# Patient Record
Sex: MALE | Race: WHITE | Employment: FULL TIME | URBAN - METROPOLITAN AREA
[De-identification: names, ages, dates, MRNs, and addresses within clinical notes are randomized per-mention and may not be internally consistent; named-entity substitution may affect disease eponyms.]

---

## 2021-03-19 PROBLEM — E78.00 HIGH CHOLESTEROL: Status: ACTIVE | Noted: 2021-03-19

## 2021-03-19 PROBLEM — R73.01 IFG (IMPAIRED FASTING GLUCOSE): Status: ACTIVE | Noted: 2021-03-19

## 2021-03-19 PROBLEM — E55.9 VITAMIN D DEFICIENCY: Status: ACTIVE | Noted: 2021-03-19

## 2021-08-19 ENCOUNTER — HOSPITAL ENCOUNTER (OUTPATIENT)
Age: 51
Discharge: HOME | End: 2021-08-19
Payer: COMMERCIAL

## 2021-08-19 VITALS
DIASTOLIC BLOOD PRESSURE: 82 MMHG | HEART RATE: 84 BPM | TEMPERATURE: 99 F | OXYGEN SATURATION: 97 % | WEIGHT: 235 LBS | HEIGHT: 72 IN | SYSTOLIC BLOOD PRESSURE: 136 MMHG | RESPIRATION RATE: 16 BRPM | BODY MASS INDEX: 31.83 KG/M2

## 2021-08-19 DIAGNOSIS — Z20.822 CLOSE EXPOSURE TO COVID-19 VIRUS: Primary | ICD-10-CM

## 2021-08-19 DIAGNOSIS — Z20.822 LAB TEST NEGATIVE FOR COVID-19 VIRUS: ICD-10-CM

## 2021-08-19 LAB — SARS-COV-2 RNA RESP QL NAA+PROBE: NOT DETECTED

## 2021-08-19 PROCEDURE — 99202 OFFICE O/P NEW SF 15 MIN: CPT

## 2021-08-19 PROCEDURE — 99203 OFFICE O/P NEW LOW 30 MIN: CPT

## 2021-08-19 NOTE — ED INITIAL ASSESSMENT (HPI)
Patient reports his wife tested positive for covid on Monday. Patient is requesting covid test.  Patient denies symptoms.

## 2021-08-20 NOTE — ED PROVIDER NOTES
My AC  Patient Seen in: [facility]      History   Patient presents with:  Testing    Stated Complaint:     HPI/Subjective:   HPI    Adele Stewart is a 82-year-old male who presents today for Covid testing.   His daughter is here as well and is exper place, and time. Patient appears well-developed and well-nourished, non-toxic and in no acute distress. Actively wearing a mask. Head: Normocephalic and atraumatic.    Cardiovascular: Normal rate, regular rhythm, normal heart sounds and intact distal puls

## 2021-09-27 PROBLEM — R79.89 LOW TESTOSTERONE: Status: ACTIVE | Noted: 2021-09-27

## 2021-09-27 PROBLEM — E66.9 OBESITY (BMI 30-39.9): Status: ACTIVE | Noted: 2021-09-27

## 2021-10-16 ENCOUNTER — HOSPITAL ENCOUNTER (EMERGENCY)
Facility: HOSPITAL | Age: 51
Discharge: HOME | End: 2021-10-16
Attending: EMERGENCY MEDICINE
Payer: COMMERCIAL

## 2021-10-16 ENCOUNTER — APPOINTMENT (OUTPATIENT)
Dept: CT IMAGING | Facility: HOSPITAL | Age: 51
End: 2021-10-16
Attending: EMERGENCY MEDICINE
Payer: COMMERCIAL

## 2021-10-16 VITALS
SYSTOLIC BLOOD PRESSURE: 105 MMHG | HEIGHT: 72 IN | BODY MASS INDEX: 31.15 KG/M2 | WEIGHT: 230 LBS | DIASTOLIC BLOOD PRESSURE: 59 MMHG | HEART RATE: 81 BPM | TEMPERATURE: 98 F | OXYGEN SATURATION: 95 % | RESPIRATION RATE: 16 BRPM

## 2021-10-16 DIAGNOSIS — N23 RENAL COLIC: Primary | ICD-10-CM

## 2021-10-16 DIAGNOSIS — N20.0 KIDNEY STONE: ICD-10-CM

## 2021-10-16 PROCEDURE — 99284 EMERGENCY DEPT VISIT MOD MDM: CPT

## 2021-10-16 PROCEDURE — 96374 THER/PROPH/DIAG INJ IV PUSH: CPT

## 2021-10-16 PROCEDURE — 74176 CT ABD & PELVIS W/O CONTRAST: CPT | Performed by: EMERGENCY MEDICINE

## 2021-10-16 PROCEDURE — 96361 HYDRATE IV INFUSION ADD-ON: CPT

## 2021-10-16 PROCEDURE — 85025 COMPLETE CBC W/AUTO DIFF WBC: CPT | Performed by: EMERGENCY MEDICINE

## 2021-10-16 PROCEDURE — 80053 COMPREHEN METABOLIC PANEL: CPT | Performed by: EMERGENCY MEDICINE

## 2021-10-16 PROCEDURE — 96375 TX/PRO/DX INJ NEW DRUG ADDON: CPT

## 2021-10-16 PROCEDURE — 81001 URINALYSIS AUTO W/SCOPE: CPT | Performed by: EMERGENCY MEDICINE

## 2021-10-16 PROCEDURE — 99285 EMERGENCY DEPT VISIT HI MDM: CPT

## 2021-10-16 RX ORDER — HYDROCODONE BITARTRATE AND ACETAMINOPHEN 5; 325 MG/1; MG/1
1-2 TABLET ORAL EVERY 6 HOURS PRN
Qty: 10 TABLET | Refills: 0 | Status: SHIPPED | OUTPATIENT
Start: 2021-10-16 | End: 2021-10-23

## 2021-10-16 RX ORDER — ONDANSETRON 4 MG/1
4 TABLET, ORALLY DISINTEGRATING ORAL EVERY 4 HOURS PRN
Qty: 10 TABLET | Refills: 0 | Status: SHIPPED | OUTPATIENT
Start: 2021-10-16 | End: 2021-10-23

## 2021-10-16 RX ORDER — HYDROCODONE BITARTRATE AND ACETAMINOPHEN 5; 325 MG/1; MG/1
1-2 TABLET ORAL EVERY 6 HOURS PRN
Qty: 10 TABLET | Refills: 0 | Status: SHIPPED | OUTPATIENT
Start: 2021-10-16 | End: 2021-10-16

## 2021-10-16 RX ORDER — CEPHALEXIN 500 MG/1
500 CAPSULE ORAL 3 TIMES DAILY
Qty: 15 CAPSULE | Refills: 0 | Status: SHIPPED | OUTPATIENT
Start: 2021-10-16 | End: 2021-10-21

## 2021-10-16 RX ORDER — CEPHALEXIN 500 MG/1
500 CAPSULE ORAL ONCE
Status: COMPLETED | OUTPATIENT
Start: 2021-10-16 | End: 2021-10-16

## 2021-10-16 RX ORDER — TAMSULOSIN HYDROCHLORIDE 0.4 MG/1
0.4 CAPSULE ORAL DAILY
Qty: 7 CAPSULE | Refills: 0 | Status: SHIPPED | OUTPATIENT
Start: 2021-10-16 | End: 2021-10-23

## 2021-10-16 RX ORDER — KETOROLAC TROMETHAMINE 30 MG/ML
15 INJECTION, SOLUTION INTRAMUSCULAR; INTRAVENOUS ONCE
Status: COMPLETED | OUTPATIENT
Start: 2021-10-16 | End: 2021-10-16

## 2021-10-16 RX ORDER — CEPHALEXIN 500 MG/1
500 CAPSULE ORAL 3 TIMES DAILY
Qty: 15 CAPSULE | Refills: 0 | Status: SHIPPED | OUTPATIENT
Start: 2021-10-16 | End: 2021-10-16

## 2021-10-16 RX ORDER — HYDROMORPHONE HYDROCHLORIDE 1 MG/ML
1 INJECTION, SOLUTION INTRAMUSCULAR; INTRAVENOUS; SUBCUTANEOUS ONCE
Status: COMPLETED | OUTPATIENT
Start: 2021-10-16 | End: 2021-10-16

## 2021-10-16 RX ORDER — ONDANSETRON 4 MG/1
4 TABLET, ORALLY DISINTEGRATING ORAL EVERY 4 HOURS PRN
Qty: 10 TABLET | Refills: 0 | Status: SHIPPED | OUTPATIENT
Start: 2021-10-16 | End: 2021-10-16

## 2021-10-16 RX ORDER — ONDANSETRON 2 MG/ML
4 INJECTION INTRAMUSCULAR; INTRAVENOUS ONCE
Status: COMPLETED | OUTPATIENT
Start: 2021-10-16 | End: 2021-10-16

## 2021-10-16 RX ORDER — TAMSULOSIN HYDROCHLORIDE 0.4 MG/1
0.4 CAPSULE ORAL DAILY
Qty: 7 CAPSULE | Refills: 0 | Status: SHIPPED | OUTPATIENT
Start: 2021-10-16 | End: 2021-10-16

## 2021-10-16 NOTE — ED PROVIDER NOTES
Patient Seen in: [facility] Emergency Department      History   Patient presents with:  Abdomen/Flank Pain    Stated Complaint:     Subjective:   HPI    Patient is a 51-year-old male with a history of kidney stones presents with abrupt onset of left Moist mucous membranes  Lungs: No tachypnea  Cardiac: No tachycardia  Abdomen: Soft. No focal tenderness. Skin: No rashes, pallor  Neuro: No focal deficits.   Extremities: No cyanosis/edema    ED Course     Labs Reviewed   COMP METABOLIC PANEL (14) - Abno count. Case was discussed with Connie Medina of urology who is patient is seen in the past.  Will start on oral antibiotics.   Discussed at length with patient and if he develops any fevers, uncontrolled pain, vomiting, new complaints is return immediately to t

## 2021-10-17 NOTE — CM/SW NOTE
Call received from pt's wife - the escribed CD'X (Keflex, Norco, Zofran and Flomax) were sent to [facility] in [facility] which is now closed. Wife requesting rx's be sent to [facility] on [address].     ALIE called and spoke with [facility] Pharmacist @

## 2022-02-28 PROBLEM — E23.6 PITUITARY MASS (HCC): Status: ACTIVE | Noted: 2022-02-28

## 2022-10-29 ENCOUNTER — HOSPITAL ENCOUNTER (EMERGENCY)
Facility: HOSPITAL | Age: 52
Discharge: HOME | End: 2022-10-29
Attending: EMERGENCY MEDICINE
Payer: COMMERCIAL

## 2022-10-29 ENCOUNTER — APPOINTMENT (OUTPATIENT)
Dept: CT IMAGING | Facility: HOSPITAL | Age: 52
End: 2022-10-29
Payer: COMMERCIAL

## 2022-10-29 VITALS
TEMPERATURE: 98 F | DIASTOLIC BLOOD PRESSURE: 73 MMHG | OXYGEN SATURATION: 100 % | HEART RATE: 73 BPM | SYSTOLIC BLOOD PRESSURE: 122 MMHG | RESPIRATION RATE: 18 BRPM

## 2022-10-29 DIAGNOSIS — N20.0 KIDNEY STONE: Primary | ICD-10-CM

## 2022-10-29 LAB
BILIRUB UR QL STRIP.AUTO: NEGATIVE
COLOR UR AUTO: YELLOW
GLUCOSE UR STRIP.AUTO-MCNC: NEGATIVE MG/DL
KETONES UR STRIP.AUTO-MCNC: 20 MG/DL
LEUKOCYTE ESTERASE UR QL STRIP.AUTO: NEGATIVE
NITRITE UR QL STRIP.AUTO: NEGATIVE
PH UR STRIP.AUTO: 5 [PH] (ref 5–8)
PROT UR STRIP.AUTO-MCNC: 30 MG/DL
RBC #/AREA URNS AUTO: >10 /HPF
SP GR UR STRIP.AUTO: 1.02 (ref 1–1.03)
UROBILINOGEN UR STRIP.AUTO-MCNC: <2 MG/DL

## 2022-10-29 PROCEDURE — 81001 URINALYSIS AUTO W/SCOPE: CPT | Performed by: EMERGENCY MEDICINE

## 2022-10-29 PROCEDURE — 99284 EMERGENCY DEPT VISIT MOD MDM: CPT

## 2022-10-29 PROCEDURE — 74176 CT ABD & PELVIS W/O CONTRAST: CPT

## 2022-10-29 PROCEDURE — 81001 URINALYSIS AUTO W/SCOPE: CPT

## 2022-10-29 RX ORDER — TAMSULOSIN HYDROCHLORIDE 0.4 MG/1
0.4 CAPSULE ORAL DAILY
Qty: 7 CAPSULE | Refills: 0 | Status: SHIPPED | OUTPATIENT
Start: 2022-10-29 | End: 2022-11-05

## 2022-10-29 RX ORDER — HYDROCODONE BITARTRATE AND ACETAMINOPHEN 5; 325 MG/1; MG/1
1-2 TABLET ORAL EVERY 6 HOURS PRN
Qty: 10 TABLET | Refills: 0 | Status: SHIPPED | OUTPATIENT
Start: 2022-10-29 | End: 2022-11-03

## 2022-10-29 RX ORDER — ONDANSETRON 4 MG/1
4 TABLET, ORALLY DISINTEGRATING ORAL EVERY 6 HOURS PRN
Qty: 15 TABLET | Refills: 0 | Status: SHIPPED | OUTPATIENT
Start: 2022-10-29 | End: 2022-11-05

## 2022-10-29 NOTE — ED INITIAL ASSESSMENT (HPI)
A&Ox3 patient p/w c/o R flank pain    Patient reports hx of kidney stones, pain is similar to previous kidney stones. Denies any blood in urine. No OTC medications taken PTA.     RR even/NL, speaking in full clear sentences, ambulatory w/ steady gait

## 2022-10-29 NOTE — ED QUICK NOTES
Pt reports that he is feeling better, no pain in the last 45min. Reports that he would like to wait to see the doctor before initiating any blood work or IV.

## 2025-02-10 ENCOUNTER — HOSPITAL ENCOUNTER (OUTPATIENT)
Age: 55
Discharge: HOME | End: 2025-02-10
Payer: COMMERCIAL

## 2025-02-10 VITALS
WEIGHT: 206 LBS | HEIGHT: 71 IN | SYSTOLIC BLOOD PRESSURE: 121 MMHG | DIASTOLIC BLOOD PRESSURE: 64 MMHG | TEMPERATURE: 99 F | HEART RATE: 96 BPM | BODY MASS INDEX: 28.84 KG/M2 | RESPIRATION RATE: 18 BRPM | OXYGEN SATURATION: 96 %

## 2025-02-10 DIAGNOSIS — J06.9 VIRAL URI: Primary | ICD-10-CM

## 2025-02-10 LAB
POCT INFLUENZA A: NEGATIVE
POCT INFLUENZA B: NEGATIVE

## 2025-02-10 PROCEDURE — 87502 INFLUENZA DNA AMP PROBE: CPT | Performed by: PHYSICIAN ASSISTANT

## 2025-02-10 PROCEDURE — 99213 OFFICE O/P EST LOW 20 MIN: CPT

## 2025-02-10 PROCEDURE — 99212 OFFICE O/P EST SF 10 MIN: CPT

## 2025-02-10 NOTE — ED INITIAL ASSESSMENT (HPI)
Cough - since Feb 5  chills, headache, takes  advil, delsym . Also c/o  loss of appetite  Fever- temp 101.5 thursday

## 2025-02-10 NOTE — ED PROVIDER NOTES
Patient Seen in: [facility]      History     Chief Complaint   Patient presents with    Upper Respiratory Infection    Cough     Stated Complaint: Cold Symp    Subjective:   HPI    54-year-old male presents to immediate care with his wife for evaluation of chills, decreased appetite starting 5 days ago.  Fever Tmax 101.5 days ago.  No fever since.  Denies chest pain or SOB.  Delsym has helped.      Objective:     Past Medical History:    Pituitary tumor              Past Surgical History:   Procedure Laterality Date    Cystoscopy,manipulatn Right 12/4/2015    Procedure: LITHOTRIPSY WITH CYSTOSCOPY, STENT PLACEMENT;  Surgeon: Jake Joseph MD;  Location: [facility]    Fragmenting of kidney stone Right 12/4/2015    Procedure: LITHOTRIPSY WITH CYSTOSCOPY, STENT PLACEMENT;  Surgeon: Jake Joseph MD;  Location: [facility]    Hernia surgery      38 yrs ago                Social History     Socioeconomic History    Marital status:    Tobacco Use    Smoking status: Never    Smokeless tobacco: Never   Vaping Use    Vaping status: Never Used   Substance and Sexual Activity    Alcohol use: Yes     Comment: socially    Drug use: No              Review of Systems    Positive for stated complaint: Cold Symp  Other systems are as noted in HPI.  Constitutional and vital signs reviewed.      All other systems reviewed and negative except as noted above.    Physical Exam     ED Triage Vitals [02/10/25 1326]   /64   Pulse 96   Resp 18   Temp 98.5 °F (36.9 °C)   Temp src Oral   SpO2 96 %   O2 Device None (Room air)       Current Vitals:   Vital Signs  BP: 121/64  Pulse: 96  Resp: 18  Temp: 98.5 °F (36.9 °C)  Temp src: Oral    Oxygen Therapy  SpO2: 96 %  O2 Device: None (Room air)        Physical Exam  Vitals and nursing note reviewed.   Constitutional:       General: He is not in acute distress.     Appearance: Normal appearance. He is not ill-appearing, toxic-appearing or  diaphoretic.   HENT:      Mouth/Throat:      Mouth: Mucous membranes are moist.      Pharynx: Oropharynx is clear.   Cardiovascular:      Rate and Rhythm: Normal rate.      Heart sounds: Normal heart sounds.   Pulmonary:      Effort: Pulmonary effort is normal. No respiratory distress.      Breath sounds: Normal breath sounds. No wheezing.   Neurological:      Mental Status: He is alert and oriented to person, place, and time.   Psychiatric:         Behavior: Behavior normal.         ED Course     Labs Reviewed   POCT FLU TEST - Normal    Narrative:     This assay is a rapid molecular in vitro test utilizing nucleic acid amplification of influenza A and B viral RNA.        MDM      Differential diagnosis considered but not limited to influenza, COVID, bronchitis, pneumonia    Patient is afebrile and nontoxic in appearance.  Physical exam is reassuring and consistent with viral illness.  Chest x-ray deferred.    Influenza negative. Patient politely declined COVID testing, prefers to test at home. He also declined prescription for Tessalon Perles stating Delsym helps.  Supportive care and return instructions discussed with understanding.      Medical Decision Making  Amount and/or Complexity of Data Reviewed  Labs: ordered. Decision-making details documented in ED Course.    Risk  OTC drugs.        Disposition and Plan     Clinical Impression:  1. Viral URI         Disposition:  Discharge  2/10/2025  2:38 pm    Follow-up:  [facility]  [address]  [facility] [address]  [phone]    If symptoms worsen          Medications Prescribed:  Discharge Medication List as of 2/10/2025  2:47 PM              Supplementary Documentation: